# Patient Record
Sex: MALE | Race: WHITE | URBAN - NONMETROPOLITAN AREA
[De-identification: names, ages, dates, MRNs, and addresses within clinical notes are randomized per-mention and may not be internally consistent; named-entity substitution may affect disease eponyms.]

---
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
## 2024-08-15 ENCOUNTER — PROCEDURE VISIT (OUTPATIENT)
Dept: PODIATRY | Age: 24
End: 2024-08-15
Payer: COMMERCIAL

## 2024-08-15 VITALS
RESPIRATION RATE: 20 BRPM | DIASTOLIC BLOOD PRESSURE: 72 MMHG | SYSTOLIC BLOOD PRESSURE: 120 MMHG | WEIGHT: 186.4 LBS | HEART RATE: 76 BPM

## 2024-08-15 DIAGNOSIS — M79.674 PAIN OF TOE OF RIGHT FOOT: ICD-10-CM

## 2024-08-15 DIAGNOSIS — L92.9 GRANULOMA OF GREAT TOE: ICD-10-CM

## 2024-08-15 DIAGNOSIS — L60.0 OC (ONYCHOCRYPTOSIS): Primary | ICD-10-CM

## 2024-08-15 PROCEDURE — G8427 DOCREV CUR MEDS BY ELIG CLIN: HCPCS | Performed by: PODIATRIST

## 2024-08-15 PROCEDURE — 11750 EXCISION NAIL&NAIL MATRIX: CPT | Performed by: PODIATRIST

## 2024-08-15 PROCEDURE — G8421 BMI NOT CALCULATED: HCPCS | Performed by: PODIATRIST

## 2024-08-15 PROCEDURE — 99203 OFFICE O/P NEW LOW 30 MIN: CPT | Performed by: PODIATRIST

## 2024-08-15 PROCEDURE — 1036F TOBACCO NON-USER: CPT | Performed by: PODIATRIST

## 2024-08-15 RX ORDER — LIDOCAINE HYDROCHLORIDE 10 MG/ML
3 INJECTION, SOLUTION EPIDURAL; INFILTRATION; INTRACAUDAL; PERINEURAL ONCE
Status: COMPLETED | OUTPATIENT
Start: 2024-08-15 | End: 2024-08-15

## 2024-08-15 RX ADMIN — LIDOCAINE HYDROCHLORIDE 3 ML: 10 INJECTION, SOLUTION EPIDURAL; INFILTRATION; INTRACAUDAL; PERINEURAL at 10:33

## 2024-08-15 RX ADMIN — LIDOCAINE HYDROCHLORIDE 3 ML: 10 INJECTION, SOLUTION EPIDURAL; INFILTRATION; INTRACAUDAL; PERINEURAL at 10:24

## 2024-08-15 RX ADMIN — Medication: at 10:25

## 2024-08-15 NOTE — PATIENT INSTRUCTIONS
Patient Instructions: Ingrown Toe Nail Removal    Antibiotic ointment was applied to the toe immediately after the procedure. The ointment is soothing and helps the toe to heal faster. You should apply the antibiotic ointment twice daily until the wound is completely healed.  Leave your bandage clean and dry for the remainder of the day.  Beginning tomorrow you may remove bandage and shower. Following your shower, soak the toe in warm water and epsom salts for 10 min - perform the epsom salt soaks twice daily for 1 week.  After that time perform the soaking once per day for the second week.   Gently dry the area and apply antibiotic ointment.  Avoid baths and swimming pools for the next 2 weeks.   Your bandage will help to pad and protect the wound, while absorbing drainage from the wound.  The toe may drain clear fluid for up to 2 weeks (this is normal).  You can replace the bandage if blood or fluid soaks the bandage.   You may experience some pain after the procedure. If you experience discomfort, elevate and ice your foot.  You may take over the counter Tylenol (Acetaminophen) two 325-mg tablets every 4 hours as needed.   You should wear loose-fitting shoes or sneakers for the first 2 weeks after the procedure. You may perform activity to tolerance.  If you notice any signs of infection including: increased temperature/swelling/redness, thick yellow drainage, fever/chills/nausea/vomiting, please contact the office as soon as possible.          NOTE:  Antibiotic ointment provided for your use only is Silvadene Cream.   Silvadene is for topical use only.  :  Potential Side Effects:  Pain, burning, or itching of the treated skin may occur. Skin and mucous membranes (such as the gums) may become blue/gray in color. If any of these effects persist or worsen, tell your doctor or pharmacist promptly.  Remember that your doctor has prescribed this medication because he or she has judged that the benefit to you is

## 2024-08-15 NOTE — PROGRESS NOTES
Subjective:  Tico Lr is a 23 y.o. male who presents to the office today complaining of an ingrown nail.  Symptoms began 1 year(s) ago.  Patient relates pain is Present.  Pain is rated 5 out of 10 and is described as moderate.  Treatments prior to today's visit include: soaking, cutting nail back.  Currently denies F/C/N/V.     Allergies   Allergen Reactions    Milk-Related Compounds        History reviewed. No pertinent past medical history.    Prior to Admission medications    Medication Sig Start Date End Date Taking? Authorizing Provider   silver sulfADIAZINE (SILVADENE) 1 % cream Apply topically daily. 8/15/24  Yes Max Brown, DPLLOYD     ROS: All 14 ROS systems reviewed and pertinent positives noted above, all others negative.   Objective:  Patient is alert and oriented.  Vascular: DP and PT pulses palpable 2/4, bilateral.  CFT <3 seconds, bilateral.  Hair growth present to the level of the digits, bilateral.  Edema absent, bilateral.  Varicosities absent, bilateral. Erythema absent, bilateral. Distal Rubor absent bilateral.  Temperature within normal limits bilateral. Hyperpigmentation absent bilateral. No atrophic skin.   Neuro: Protective sensation is intact.  Reflexes WNL.  Musculoskeletal:  Pain present upon palpation of right, lateral, medial, hallux. Muscle strength 5/5, Bilateral. within normal limits medial longitudinal arch, Bilateral. ROM WNL.  Integument: Onychocryptosis present right, lateral, medial, hallux.  Granuloma is present right, lateral, medial, hallux.  Paronychia changes with drainage and crust are present right.  Skin color, texture, turgor normal. No rashes or lesions..    Assessment:   Diagnosis Orders   1. OC (onychocryptosis)  REMOVAL OF NAIL MATRIX      2. Pain of toe of right foot  REMOVAL OF NAIL MATRIX      3. Granuloma of great toe  REMOVAL OF NAIL MATRIX            Plan:  Patient was educated on all treatment options.  Risks and complications were discussed with the